# Patient Record
Sex: FEMALE | Race: BLACK OR AFRICAN AMERICAN | URBAN - METROPOLITAN AREA
[De-identification: names, ages, dates, MRNs, and addresses within clinical notes are randomized per-mention and may not be internally consistent; named-entity substitution may affect disease eponyms.]

---

## 2017-09-26 ENCOUNTER — EMERGENCY (EMERGENCY)
Facility: HOSPITAL | Age: 22
LOS: 1 days | Discharge: HOME | End: 2017-09-26
Attending: EMERGENCY MEDICINE | Admitting: EMERGENCY MEDICINE
Payer: SELF-PAY

## 2017-09-26 VITALS
HEART RATE: 84 BPM | SYSTOLIC BLOOD PRESSURE: 113 MMHG | RESPIRATION RATE: 20 BRPM | TEMPERATURE: 98 F | OXYGEN SATURATION: 100 % | DIASTOLIC BLOOD PRESSURE: 79 MMHG

## 2017-09-26 VITALS
RESPIRATION RATE: 16 BRPM | HEART RATE: 76 BPM | WEIGHT: 145.06 LBS | SYSTOLIC BLOOD PRESSURE: 111 MMHG | TEMPERATURE: 98 F | DIASTOLIC BLOOD PRESSURE: 78 MMHG | OXYGEN SATURATION: 97 %

## 2017-09-26 VITALS
DIASTOLIC BLOOD PRESSURE: 58 MMHG | TEMPERATURE: 99 F | RESPIRATION RATE: 18 BRPM | SYSTOLIC BLOOD PRESSURE: 117 MMHG | OXYGEN SATURATION: 100 % | HEART RATE: 82 BPM

## 2017-09-26 LAB
ALBUMIN SERPL ELPH-MCNC: 3.8 G/DL — SIGNIFICANT CHANGE UP (ref 3.4–5)
ALP SERPL-CCNC: 116 U/L — SIGNIFICANT CHANGE UP (ref 40–120)
ALT FLD-CCNC: 15 U/L — SIGNIFICANT CHANGE UP (ref 12–42)
ANION GAP SERPL CALC-SCNC: 6 MMOL/L — LOW (ref 9–16)
AST SERPL-CCNC: 20 U/L — SIGNIFICANT CHANGE UP (ref 15–37)
BASOPHILS NFR BLD AUTO: 0.5 % — SIGNIFICANT CHANGE UP (ref 0–2)
BILIRUB SERPL-MCNC: 0.5 MG/DL — SIGNIFICANT CHANGE UP (ref 0.2–1.2)
BUN SERPL-MCNC: 11 MG/DL — SIGNIFICANT CHANGE UP (ref 7–23)
CALCIUM SERPL-MCNC: 9 MG/DL — SIGNIFICANT CHANGE UP (ref 8.5–10.5)
CHLORIDE SERPL-SCNC: 105 MMOL/L — SIGNIFICANT CHANGE UP (ref 96–108)
CO2 SERPL-SCNC: 26 MMOL/L — SIGNIFICANT CHANGE UP (ref 22–31)
CREAT SERPL-MCNC: 0.6 MG/DL — SIGNIFICANT CHANGE UP (ref 0.5–1.3)
EOSINOPHIL NFR BLD AUTO: 0.3 % — SIGNIFICANT CHANGE UP (ref 0–6)
GLUCOSE SERPL-MCNC: 106 MG/DL — HIGH (ref 70–99)
HCG SERPL-ACNC: 262 MIU/ML — SIGNIFICANT CHANGE UP
HCT VFR BLD CALC: 24 % — LOW (ref 34.5–45)
HGB BLD-MCNC: 7.7 G/DL — LOW (ref 11.5–15.5)
IMM GRANULOCYTES NFR BLD AUTO: 0.5 % — SIGNIFICANT CHANGE UP (ref 0–1.5)
LYMPHOCYTES # BLD AUTO: 9 % — LOW (ref 13–44)
MCHC RBC-ENTMCNC: 27.5 PG — SIGNIFICANT CHANGE UP (ref 27–34)
MCHC RBC-ENTMCNC: 32.1 G/DL — SIGNIFICANT CHANGE UP (ref 32–36)
MCV RBC AUTO: 85.7 FL — SIGNIFICANT CHANGE UP (ref 80–100)
MONOCYTES NFR BLD AUTO: 4.1 % — SIGNIFICANT CHANGE UP (ref 2–14)
NEUTROPHILS NFR BLD AUTO: 85.6 % — HIGH (ref 43–77)
PLATELET # BLD AUTO: 297 K/UL — SIGNIFICANT CHANGE UP (ref 150–400)
POTASSIUM SERPL-MCNC: 3.8 MMOL/L — SIGNIFICANT CHANGE UP (ref 3.5–5.3)
POTASSIUM SERPL-SCNC: 3.8 MMOL/L — SIGNIFICANT CHANGE UP (ref 3.5–5.3)
PROT SERPL-MCNC: 7.4 G/DL — SIGNIFICANT CHANGE UP (ref 6.4–8.2)
RBC # BLD: 2.8 M/UL — LOW (ref 3.8–5.2)
RBC # FLD: 14.6 % — SIGNIFICANT CHANGE UP (ref 10.3–16.9)
SODIUM SERPL-SCNC: 137 MMOL/L — SIGNIFICANT CHANGE UP (ref 132–145)
WBC # BLD: 6.6 K/UL — SIGNIFICANT CHANGE UP (ref 3.8–10.5)
WBC # FLD AUTO: 6.6 K/UL — SIGNIFICANT CHANGE UP (ref 3.8–10.5)

## 2017-09-26 PROCEDURE — 93010 ELECTROCARDIOGRAM REPORT: CPT

## 2017-09-26 PROCEDURE — 76815 OB US LIMITED FETUS(S): CPT | Mod: 26

## 2017-09-26 PROCEDURE — 99285 EMERGENCY DEPT VISIT HI MDM: CPT | Mod: 25

## 2017-09-26 PROCEDURE — 76817 TRANSVAGINAL US OBSTETRIC: CPT | Mod: 26

## 2017-09-26 RX ORDER — SODIUM CHLORIDE 9 MG/ML
1000 INJECTION INTRAMUSCULAR; INTRAVENOUS; SUBCUTANEOUS ONCE
Qty: 0 | Refills: 0 | Status: COMPLETED | OUTPATIENT
Start: 2017-09-26 | End: 2017-09-26

## 2017-09-26 RX ADMIN — SODIUM CHLORIDE 2000 MILLILITER(S): 9 INJECTION INTRAMUSCULAR; INTRAVENOUS; SUBCUTANEOUS at 17:00

## 2017-09-26 NOTE — ED ADULT TRIAGE NOTE - CHIEF COMPLAINT QUOTE
transfer from [facility] for vaginal bleed and lower abd cramps; for Gyn eval - re retained product of conception following

## 2017-09-26 NOTE — ED PROVIDER NOTE - PROGRESS NOTE DETAILS
Pt w symptomatic anemia, non orthostatic here but givensyncope episode today needs further evaluation for vag bleeding, US suggestive of retained products of conception. Needs OB GYN fconsult. Will call for OB GYN evaluation and transfer at [facility]. Case accepted for trabsfer by Dr De in [facility] ED. OB GYN team aware, resident Rocio

## 2017-09-26 NOTE — ED PROVIDER NOTE - OBJECTIVE STATEMENT
s/p termination 1 month ago here  s/p termination 1 month ago here from [facility] with concern for retained POC.  States she has been bleeding daily for the past month.  Notes some clots.  Associated with lower abdominal cramping.  Estimated 15 pads per day.  Today was on her way to work and felt light headed, thought she might pass out but didn't completely do so.

## 2017-09-26 NOTE — ED PROVIDER NOTE - MEDICAL DECISION MAKING DETAILS
Pt with near syncope in the setting of persistent vaginal bleeding s/p D&C 1 month ago. Will check labs, HCG, orthostatics, EKG, urine, pelvic ultrasound. Hydrate w/ IV fluids.

## 2017-09-26 NOTE — ED ADULT TRIAGE NOTE - CHIEF COMPLAINT QUOTE
Pt complaining of vaginal bleeding since August 24th. Pt passed out in [employer] today. Pt had D&C August 24th.

## 2017-09-26 NOTE — ED CLERICAL - NS ED CLERK NOTE PRE-ARRIVAL INFORMATION; ADDITIONAL PRE-ARRIVAL INFORMATION
MRN [id number], 21 y/o F 4wks vaginal bleeding after planned  and placement of IUD, syncope today, Hb 7.7, elevated  after 4wks D&C, concerned for retained products. Please complete T&S (DR. GALLARDO)

## 2017-09-26 NOTE — ED ADULT NURSE NOTE - OBJECTIVE STATEMENT
Patient present to ED, heavy vaginal bleeding status-post  and retained product of conception. Patient received as transfer from [facility], for GYN/OB consult, patient received with IV intact and IV fluids in progress. Patient reports pain to lower pelvic area 5/10 at this time.

## 2017-09-26 NOTE — ED PROVIDER NOTE - MEDICAL DECISION MAKING DETAILS
retained product of conception.  Seen by gyn who removed some poc at bedside but would like to admit for D and C in OR.  Hemodynamically stable

## 2017-09-26 NOTE — ED PROVIDER NOTE - OBJECTIVE STATEMENT
21 yo F w/ no pertinent PMHx A1 with recent D&C (with IUD placed after) on 17 complains of persistent vaginal bleeding with increased fatigue, lower abdominal cramping x 3 days and near syncope today. Pt reports mild spotting and lower abdominal cramping x 2 weeks after termination, but notes onset of heavy vaginal bleeding with clotting worsening abdominal cramps 2 weeks ago. Today pt notes increased fatigue and generalized weakness while on the train; pt felt nauseous but was able [place] walk [place] and [place]; pt called EMS from [employer] adjacent to the subway. No LOC, chest pain, syncope, vomiting, fever, chills, back painPt was seen at  2 weeks ago at onset of worsening symptoms; states she was told HCG levels was 1000s and had transvaginal ultrasound that showed no retained products. Pt notes mild exertional dyspnea since onset of heavy vaginal bleeding. 23 yo F w/ no pertinent PMHx A1 with recent D&C (with IUD placed after) on 17 complains of persistent vaginal bleeding with increased fatigue, lower abdominal cramping x 3 days and near syncope today. Pt reports mild spotting and lower abdominal cramping x 2 weeks after termination at [facility], but notes onset of heavy vaginal bleeding with clotting worsening abdominal cramps 2 weeks ago. Today pt notes increased fatigue and generalized weakness while on the train; pt felt nauseous but was able to walk [place] and [place] subway; pt called EMS from [employer] adjacent to the subway. No LOC, chest pain, syncope, vomiting, fever, chills, back painPt was seen at  2 weeks ago at onset of worsening symptoms; states she was told HCG levels was 1000s and had transvaginal ultrasound that showed no retained products. Pt notes mild exertional dyspnea since onset of heavy vaginal bleeding. 23 yo F w/ no pertinent PMHx A1 with recent D&C (with IUD placed after) on 17 complains of persistent vaginal bleeding with increased fatigue, lower abdominal cramping x 3 days and near syncope today. Pt reports mild spotting and lower abdominal cramping x 2 weeks after termination at [facility], but notes onset of heavy vaginal bleeding with clotting worsening abdominal cramps 2 weeks ago. Today pt notes increased fatigue and generalized weakness while on the train; pt felt nauseous but was able to walk off the train and [place] of the subway; pt called EMS from [employer] adjacent to the subway. No LOC, chest pain, syncope, vomiting, fever, chills, back pain. Pt notes mild exertional dyspnea since onset of heavy vaginal bleeding. Pt was seen at  2 weeks ago at onset of worsening symptoms; states she was told HCG levels was 1000s and had transvaginal ultrasound that showed no retained products. GYN at  offered to remove IUD, however was unable to do so because the IUD string was too short (pt would need to schedule surgery); pt was discharged on medication x 1 week to stop vaginal bleeding without improvement.

## 2017-09-27 ENCOUNTER — INPATIENT (INPATIENT)
Facility: HOSPITAL | Age: 22
LOS: 0 days | Discharge: HOME | DRG: 770 | End: 2017-09-27
Attending: OBSTETRICS & GYNECOLOGY | Admitting: OBSTETRICS & GYNECOLOGY
Payer: COMMERCIAL

## 2017-09-27 VITALS
SYSTOLIC BLOOD PRESSURE: 112 MMHG | DIASTOLIC BLOOD PRESSURE: 64 MMHG | HEART RATE: 92 BPM | TEMPERATURE: 98 F | RESPIRATION RATE: 15 BRPM | OXYGEN SATURATION: 100 %

## 2017-09-27 DIAGNOSIS — Z33.2 ENCOUNTER FOR ELECTIVE TERMINATION OF PREGNANCY: Chronic | ICD-10-CM

## 2017-09-27 LAB
APTT BLD: 28.7 SEC — SIGNIFICANT CHANGE UP (ref 27.5–37.4)
BASOPHILS NFR BLD AUTO: 0.2 % — SIGNIFICANT CHANGE UP (ref 0–2)
BLD GP AB SCN SERPL QL: NEGATIVE — SIGNIFICANT CHANGE UP
EOSINOPHIL NFR BLD AUTO: 1.9 % — SIGNIFICANT CHANGE UP (ref 0–6)
HCT VFR BLD CALC: 23.2 % — LOW (ref 34.5–45)
HGB BLD-MCNC: 7.2 G/DL — LOW (ref 11.5–15.5)
INR BLD: 1.13 — SIGNIFICANT CHANGE UP (ref 0.88–1.16)
LYMPHOCYTES # BLD AUTO: 32.2 % — SIGNIFICANT CHANGE UP (ref 13–44)
MCHC RBC-ENTMCNC: 26.3 PG — LOW (ref 27–34)
MCHC RBC-ENTMCNC: 31 G/DL — LOW (ref 32–36)
MCV RBC AUTO: 84.7 FL — SIGNIFICANT CHANGE UP (ref 80–100)
MONOCYTES NFR BLD AUTO: 5.6 % — SIGNIFICANT CHANGE UP (ref 2–14)
NEUTROPHILS NFR BLD AUTO: 60.1 % — SIGNIFICANT CHANGE UP (ref 43–77)
PLATELET # BLD AUTO: 158 K/UL — SIGNIFICANT CHANGE UP (ref 150–400)
PROTHROM AB SERPL-ACNC: 12.6 SEC — SIGNIFICANT CHANGE UP (ref 9.8–12.7)
RBC # BLD: 2.74 M/UL — LOW (ref 3.8–5.2)
RBC # FLD: 14.6 % — SIGNIFICANT CHANGE UP (ref 10.3–16.9)
RH IG SCN BLD-IMP: POSITIVE — SIGNIFICANT CHANGE UP
RH IG SCN BLD-IMP: POSITIVE — SIGNIFICANT CHANGE UP
WBC # BLD: 5.2 K/UL — SIGNIFICANT CHANGE UP (ref 3.8–10.5)
WBC # FLD AUTO: 5.2 K/UL — SIGNIFICANT CHANGE UP (ref 3.8–10.5)

## 2017-09-27 PROCEDURE — 85025 COMPLETE CBC W/AUTO DIFF WBC: CPT

## 2017-09-27 PROCEDURE — 86901 BLOOD TYPING SEROLOGIC RH(D): CPT

## 2017-09-27 PROCEDURE — 36415 COLL VENOUS BLD VENIPUNCTURE: CPT

## 2017-09-27 PROCEDURE — 85730 THROMBOPLASTIN TIME PARTIAL: CPT

## 2017-09-27 PROCEDURE — 86850 RBC ANTIBODY SCREEN: CPT

## 2017-09-27 PROCEDURE — 99285 EMERGENCY DEPT VISIT HI MDM: CPT | Mod: 25

## 2017-09-27 PROCEDURE — 86923 COMPATIBILITY TEST ELECTRIC: CPT

## 2017-09-27 PROCEDURE — 85610 PROTHROMBIN TIME: CPT

## 2017-09-27 PROCEDURE — 86900 BLOOD TYPING SEROLOGIC ABO: CPT

## 2017-09-27 RX ORDER — METOCLOPRAMIDE HCL 10 MG
10 TABLET ORAL EVERY 6 HOURS
Qty: 0 | Refills: 0 | Status: DISCONTINUED | OUTPATIENT
Start: 2017-09-27 | End: 2017-09-27

## 2017-09-27 RX ORDER — CEFOTETAN DISODIUM 1 G
2 VIAL (EA) INJECTION EVERY 12 HOURS
Qty: 0 | Refills: 0 | Status: DISCONTINUED | OUTPATIENT
Start: 2017-09-27 | End: 2017-09-27

## 2017-09-27 RX ORDER — ONDANSETRON 8 MG/1
4 TABLET, FILM COATED ORAL ONCE
Qty: 0 | Refills: 0 | Status: DISCONTINUED | OUTPATIENT
Start: 2017-09-27 | End: 2017-09-27

## 2017-09-27 RX ORDER — CEFOTETAN DISODIUM 1 G
2 VIAL (EA) INJECTION ONCE
Qty: 0 | Refills: 0 | Status: COMPLETED | OUTPATIENT
Start: 2017-09-27 | End: 2017-09-27

## 2017-09-27 RX ORDER — FERROUS SULFATE 325(65) MG
1 TABLET ORAL
Qty: 60 | Refills: 4 | OUTPATIENT
Start: 2017-09-27 | End: 2018-02-23

## 2017-09-27 RX ORDER — SODIUM CHLORIDE 9 MG/ML
1000 INJECTION, SOLUTION INTRAVENOUS
Qty: 0 | Refills: 0 | Status: DISCONTINUED | OUTPATIENT
Start: 2017-09-27 | End: 2017-09-27

## 2017-09-27 RX ORDER — IBUPROFEN 200 MG
600 TABLET ORAL EVERY 6 HOURS
Qty: 0 | Refills: 0 | Status: DISCONTINUED | OUTPATIENT
Start: 2017-09-27 | End: 2017-09-27

## 2017-09-27 RX ORDER — MORPHINE SULFATE 50 MG/1
2 CAPSULE, EXTENDED RELEASE ORAL
Qty: 0 | Refills: 0 | Status: DISCONTINUED | OUTPATIENT
Start: 2017-09-27 | End: 2017-09-27

## 2017-09-27 RX ORDER — OXYCODONE AND ACETAMINOPHEN 5; 325 MG/1; MG/1
1 TABLET ORAL EVERY 4 HOURS
Qty: 0 | Refills: 0 | Status: DISCONTINUED | OUTPATIENT
Start: 2017-09-27 | End: 2017-09-27

## 2017-09-27 RX ORDER — OXYCODONE AND ACETAMINOPHEN 5; 325 MG/1; MG/1
2 TABLET ORAL EVERY 6 HOURS
Qty: 0 | Refills: 0 | Status: DISCONTINUED | OUTPATIENT
Start: 2017-09-27 | End: 2017-09-27

## 2017-09-27 RX ADMIN — Medication 100 GRAM(S): at 03:00

## 2017-09-27 RX ADMIN — Medication 100 MILLIGRAM(S): at 03:00

## 2017-09-27 NOTE — DISCHARGE NOTE ADULT - CARE PROVIDER_API CALL
Tara You), [facility]etric[facility] Gynecology  [address] [place] [address]  Phone: [phone]  Fax: [phone]

## 2017-09-27 NOTE — H&P ADULT - ASSESSMENT
23yo  with incomplete AB. HDS with asymptomatic anemia 2/2 blood loss. Hb stable at 7.2 from 7.8 ~6hrs prior. TVUS and tissue removed in ED c/w retained POC; abd tenderness and foul-smell suspicious for infection; however pt is afebrile, no leukocytosis.  - Admit to GYN   - Add on for suction D+C 9/72 AM   - Will cover with Cefotetan 2g IV BID + Doxy 100mg PO BID  - Suspected rPOC removed in ED sent to pathology; will f/u results  - 2u pRBC on hold for OR; hold transfusion preop unless patient becomes symptomatic or tachycardic   - D/w Dr. You

## 2017-09-27 NOTE — H&P ADULT - NSHPPHYSICALEXAM_GEN_ALL_CORE
Vital Signs Last 24 Hrs  T(C): 36.8 (26 Sep 2017 22:35), Max: 37.2 (26 Sep 2017 20:21)  T(F): 98.3 (26 Sep 2017 22:35), Max: 99 (26 Sep 2017 20:21)  HR: 76 (26 Sep 2017 22:35) (76 - 84)  BP: 111/78 (26 Sep 2017 22:35) (111/78 - 117/58)  BP(mean): --  RR: 16 (26 Sep 2017 22:35) (16 - 20)  SpO2: 97% (26 Sep 2017 22:35) (97% - 100%)    General: NAD, A/O x3   Abd: soft, nondistended, tender across lower abdomen - no rebound or guarding  Speculum: cervix open, IUD strings visible, foul-smelling tissue containing blood clot seen protruding through cervical os, ~10-20cc BRB in vault, slow active bleed through os

## 2017-09-27 NOTE — ED ADULT NURSE REASSESSMENT NOTE - NS ED NURSE REASSESS COMMENT FT1
Patient mother on the phone, requesting medical information regarding patient disposition. Patient as per Jose Antonio Rodriguez   (Patient), it is ok to share her medical information to her mother who is presently on the phone. Confirmed with patient and informed patient of the risk and consequences of sharing this information via telephone to her mother, patient verbalizes that it is ok and she give consent to share her medical information over the phone.   Patient admitted to 08W0, with Medical Dx: Retained products of conception.  Report given to MILY Hill, patient awaiting transportation to unit.

## 2017-09-27 NOTE — BRIEF OPERATIVE NOTE - LESION SIZE
1.3cm --> reduced to 0.96cm endometrium with heterogeneous appearance, no vascular flow seen within the endometrium

## 2017-09-27 NOTE — DISCHARGE NOTE ADULT - PATIENT PORTAL LINK FT
“You can access the [facility] Patient Portal, offered by [facility], by registering with the following website: [email or web address]”

## 2017-09-27 NOTE — H&P ADULT - HISTORY OF PRESENT ILLNESS
23yo  with prolonged heavy vaginal bleeding following [facility]. Patient had IUD placed immediately after the procedure + reports saturating 1pad/day everyday since then. Went to [facility] ED 17 and was given 400mcg of Cytotec, which she placed vaginally that night after she was discharged home. Patient reports no improvement in bleeding with large clots after Cytotec. States she has been unable to work or attend school secondary to the bleeding. Patient states she faint with lightheadedness and mild nausea earlier on 17 but now those symptoms have resolved; she currently denies any lightheadedness, SOB, CP, palpitations nausea/vomiting, subjective fever or chills; currently does not have a GYN.   Denies any significant PMH or any additional PSH.   Meds: none   Allergies: none

## 2017-09-27 NOTE — H&P ADULT - NSHPLABSRESULTS_GEN_ALL_CORE
LABS:                        7.2    5.2   )-----------( 158      ( 26 Sep 2017 23:58 )             23.2     09-26    137  |  105  |  11  ----------------------------<  106<H>  3.8   |  26  |  0.60    Ca    9.0      26 Sep 2017 17:10    TPro  7.4  /  Alb  3.8  /  TBili  0.5  /  DBili  x   /  AST  20  /  ALT  15  /  AlkPhos  116  09-26      Procedure in ED: 2x2cm foul-smelling tissue containing blood clot seen protruding through cervical os --> sent to pathology   IUD removed as per patient request without complications.

## 2017-09-27 NOTE — BRIEF OPERATIVE NOTE - PROCEDURE
<<-----Click on this checkbox to enter Procedure Dilation and curettage of uterus using suction with chromosome study if indicated  09/27/2017    Active  [facility]

## 2017-09-27 NOTE — DISCHARGE NOTE ADULT - HOSPITAL COURSE
Patient admitted for vaginal bleeding with retained productions following an . Suction D&C performed without complications. Patient tolerating a regular diet, ambulating, and voiding at time of discharge.

## 2017-09-27 NOTE — DISCHARGE NOTE ADULT - CARE PLAN
Principal Discharge DX:	Retained products of conception following   Goal:	resume normal activity  Instructions for follow-up, activity and diet:	Nothing in vagina for 6 weeks. No sex, tampons, or baths. Showers ok. Motrin 600 mg every 6 hrs or Tylenol as needed for pain. Follow up with your doctor in 2 weeks. Call to make an appointment. Call your doctor if you develop a fever>101 F, severe pain, or heavy bleeding. Principal Discharge DX:	Retained products of conception following   Goal:	resume normal activity  Instructions for follow-up, activity and diet:	Nothing in vagina for 6 weeks. No sex, tampons, or baths. Showers ok. Take doxycycline 100 mg twice a day for 10 days. Take iron supplements twice a day. Motrin 600 mg every 6 hrs or Tylenol as needed for pain. Follow up with your doctor in 7-10 days. Call to make an appointment. Call your doctor if you develop a fever>101 F, severe pain, or heavy bleeding.

## 2017-09-28 LAB — SURGICAL PATHOLOGY STUDY: SIGNIFICANT CHANGE UP

## 2017-09-30 DIAGNOSIS — N93.9 ABNORMAL UTERINE AND VAGINAL BLEEDING, UNSPECIFIED: ICD-10-CM

## 2017-10-02 DIAGNOSIS — R10.9 UNSPECIFIED ABDOMINAL PAIN: ICD-10-CM

## 2017-10-02 DIAGNOSIS — D50.0 IRON DEFICIENCY ANEMIA SECONDARY TO BLOOD LOSS (CHRONIC): ICD-10-CM

## 2017-10-02 DIAGNOSIS — O03.4 INCOMPLETE SPONTANEOUS ABORTION WITHOUT COMPLICATION: ICD-10-CM

## 2018-09-01 NOTE — ED PROVIDER NOTE - TOBACCO USE
range of motion is not limited. TTP of para lumbar sacral region. 5/5 strength. range of motion is not limited. TTP of para lumbar sacral region. 5/5 strength on flexion and extension of all limbs. Unknown if ever smoked
